# Patient Record
Sex: FEMALE | Race: WHITE
[De-identification: names, ages, dates, MRNs, and addresses within clinical notes are randomized per-mention and may not be internally consistent; named-entity substitution may affect disease eponyms.]

---

## 2020-03-17 ENCOUNTER — HOSPITAL ENCOUNTER (OUTPATIENT)
Dept: HOSPITAL 92 - BICMAMMO | Age: 35
Discharge: HOME | End: 2020-03-17
Attending: FAMILY MEDICINE
Payer: COMMERCIAL

## 2020-03-17 DIAGNOSIS — Z80.3: ICD-10-CM

## 2020-03-17 DIAGNOSIS — Z12.31: Primary | ICD-10-CM

## 2020-03-17 DIAGNOSIS — N63.20: ICD-10-CM

## 2020-03-17 PROCEDURE — 77063 BREAST TOMOSYNTHESIS BI: CPT

## 2020-03-17 PROCEDURE — 77067 SCR MAMMO BI INCL CAD: CPT

## 2020-03-17 NOTE — ULT
FOCUSED ULTRASOUND OF LEFT BREAST:

 

Date:  03/17/2020

 

COMPARISON:  

None. 

 

HISTORY:  

34-year-old female with a suspicious cluster of microcalcifications within the upper outer left breas
t. There is a questionable associated subtle density and thus focused ultrasound was performed. 

 

FINDINGS:

Focused ultrasound at the 2-3 o'clock position of the left breast demonstrates no mass lesion or abno
rmal shadowing. The cluster of microcalcifications noted on mammography also performed 03/17/2020 can
not be discretely visualized on this ultrasound. 

 

IMPRESSION: 

Unremarkable focused ultrasound of the left breast. With regard to the microcalcifications seen on ma
mmography, please see mammography report for BIRADS and follow-up recommendation. 

 

POS: [facility]

## 2020-03-17 NOTE — MMO
Bilateral MAMMO Bilat Screen [facility].

 

CLINICAL HISTORY:

Patient is 34 years old and is seen for screening. The patient has the following

family history of breast cancer:  paternal aunt and paternal grandmother.

 

VIEWS:

The views performed were:  left craniocaudal spot compression magnification;

left craniocaudal spot compression with tomosynthesis; left mediolateral oblique

spot compression with tomosynthesis; left mediolateral spot compression

magnification; and left mediolateral with tomosynthesis.

 

FILMS COMPARED:

The present examination has been compared to prior imaging studies performed at

[facility] on 03/09/2020, and at [facility] on 03/17/2020.

 

This study has been interpreted with the assistance of computer-aided detection.

 

MAMMOGRAM FINDINGS:

The breasts are heterogeneously dense, which could obscure a lesion on

mammography.

 

There is a cluster of indeterminate calcifications seen in the upper-outer

region of the left breast.

 

In the right breast, there are no suspicious masses, calcifications or areas of

architectural distortion.

 

IMPRESSION:

CALCIFICATIONS IN THE LEFT BREAST ARE SUSPICIOUS. BIOPSY IS RECOMMENDED.

STEREOTACTIC BIOPSY RECOMMENDED-THIS WAS DISCUSSED WITH THE PATIENT AT THE TIME

OF INTERPRETATION.

 

THE RESULTS OF THIS EXAM WERE SENT TO THE PATIENT.

 

ACR BI-RADS Category 4 - Suspicious abnormality - biopsy should be considered

 

MAMMOGRAPHY NOTE:

1. A negative mammogram report should not delay a biopsy if a dominant of

clinically suspicious mass is present.

2. Approximately 10% to 15% of breast cancers are not detected by

mammography.

3. Adenosis and dense breasts may obscure an underlying neoplasm.

 

 

Reported by: DANIEL SORTO MD

Electonically Signed: [id number]

## 2021-04-05 ENCOUNTER — HOSPITAL ENCOUNTER (OUTPATIENT)
Dept: HOSPITAL 92 - CSHMAMMO | Age: 36
Discharge: HOME | End: 2021-04-05
Attending: SPECIALIST
Payer: COMMERCIAL

## 2021-04-05 DIAGNOSIS — R92.1: Primary | ICD-10-CM

## 2021-04-05 PROCEDURE — 77066 DX MAMMO INCL CAD BI: CPT

## 2021-04-05 PROCEDURE — G0279 TOMOSYNTHESIS, MAMMO: HCPCS

## 2021-04-13 ENCOUNTER — HOSPITAL ENCOUNTER (OUTPATIENT)
Dept: HOSPITAL 92 - MAMMO | Age: 36
End: 2021-04-13
Attending: SPECIALIST
Payer: COMMERCIAL

## 2021-04-13 DIAGNOSIS — N60.42: Primary | ICD-10-CM

## 2021-04-13 DIAGNOSIS — N60.82: ICD-10-CM

## 2021-04-13 PROCEDURE — 88305 TISSUE EXAM BY PATHOLOGIST: CPT

## 2021-04-13 PROCEDURE — 0H9U0ZX DRAINAGE OF LEFT BREAST, OPEN APPROACH, DIAGNOSTIC: ICD-10-PCS

## 2021-04-13 PROCEDURE — 76098 X-RAY EXAM SURGICAL SPECIMEN: CPT

## 2021-04-13 PROCEDURE — 19081 BX BREAST 1ST LESION STRTCTC: CPT
